# Patient Record
(demographics unavailable — no encounter records)

---

## 2025-07-17 NOTE — HISTORY OF PRESENT ILLNESS
[FreeTextEntry1] : 43 yo F presents for initial evaluation   Reason for visit: "Hemorrhoids" Referred by [facility] referral agency  PMH: tyree  PSH: denies  NKDA Meds: denies   , P:9 x , A: 1 (D&C), remaining 6 were miscarriages   FMH: Sibling (+) Crohn's  No prior colonoscopy  Patient c/o feeling "bump" in the rectum, for approximately 3 weeks.  Notes BM triggers perianal pain, with subsequent BRB noted on tissue paper and at times may drip in toilet bowl.  BRB seen with some but not all BMs.  Patient tried home creams for hemorrhoids, and suppositories with minimal to no relief.  Last used suppositories early this week.  Notes that after every BM will experience residual perianal discomfort, now feels pain after passing gas.   Has 1-2 BM per day, soft formed stools  Denies any rectal itchiness, prolapsing tissue.  Not currently taking fiber supplements or stool softeners.  Has not seen providers for these symptoms in the past.   Denies taking ASA/NSAIDs in the last week.

## 2025-07-17 NOTE — PHYSICAL EXAM
[FreeTextEntry1] : Medical assistant present for duration of physical examination   General no acute distress, alert and oriented Psych calm, responding appropriately to questions Nonlabored breathing Ambulating without assistance Skin normal color and pigment, no visible lesions or rashes    Anorectal Exam: Inspection no erythema, induration or fluctuance, no skin excoriation, anterior midline and posterior midline anal fissures, small anal tag anteriorly AVINASH tender with elevated tone, no masses palpated, no blood on gloved finger    Patient tolerated examination well.

## 2025-07-17 NOTE — ASSESSMENT
[FreeTextEntry1] : Exam findings and diagnosis were discussed at length with patient.  Recommendations including increased fiber intake, adequate daily hydration, stool softeners as needed, and sitz baths as needed and after bowel movements were discussed. Avoid constipation and diarrhea. Medical management, such diltiazem cream TID, was discussed. Rx provided and instructions for use reviewed. Discussed potential association of anal fissures and IBD; (+) sibling with Crohn's. Recommend GI consultation for colonoscopy.  F/u 6 weeks All questions answered.